# Patient Record
Sex: FEMALE | Race: BLACK OR AFRICAN AMERICAN | HISPANIC OR LATINO | URBAN - METROPOLITAN AREA
[De-identification: names, ages, dates, MRNs, and addresses within clinical notes are randomized per-mention and may not be internally consistent; named-entity substitution may affect disease eponyms.]

---

## 2022-03-18 ENCOUNTER — APPOINTMENT (RX ONLY)
Dept: URBAN - METROPOLITAN AREA CLINIC 147 | Facility: CLINIC | Age: 22
Setting detail: DERMATOLOGY
End: 2022-03-18

## 2022-03-18 DIAGNOSIS — L42 PITYRIASIS ROSEA: ICD-10-CM

## 2022-03-18 PROBLEM — L30.9 DERMATITIS, UNSPECIFIED: Status: ACTIVE | Noted: 2022-03-18

## 2022-03-18 PROCEDURE — ? ADDITIONAL NOTES

## 2022-03-18 PROCEDURE — ? MEDICATION COUNSELING

## 2022-03-18 PROCEDURE — 99204 OFFICE O/P NEW MOD 45 MIN: CPT

## 2022-03-18 PROCEDURE — ? COUNSELING

## 2022-03-18 PROCEDURE — ? PRESCRIPTION

## 2022-03-18 RX ORDER — CLOBETASOL PROPIONATE 0.5 MG/G
CREAM TOPICAL
Qty: 60 | Refills: 0 | Status: ERX | COMMUNITY
Start: 2022-03-18

## 2022-03-18 RX ORDER — METHYLPREDNISOLONE 4 MG/1
TABLET ORAL
Qty: 1 | Refills: 0 | Status: ERX | COMMUNITY
Start: 2022-03-18

## 2022-03-18 RX ADMIN — METHYLPREDNISOLONE: 4 TABLET ORAL at 00:00

## 2022-03-18 RX ADMIN — CLOBETASOL PROPIONATE: 0.5 CREAM TOPICAL at 00:00

## 2022-03-18 ASSESSMENT — LOCATION DETAILED DESCRIPTION DERM
LOCATION DETAILED: RIGHT LATERAL ABDOMEN
LOCATION DETAILED: RIGHT MEDIAL UPPER BACK

## 2022-03-18 ASSESSMENT — LOCATION ZONE DERM: LOCATION ZONE: TRUNK

## 2022-03-18 ASSESSMENT — LOCATION SIMPLE DESCRIPTION DERM
LOCATION SIMPLE: ABDOMEN
LOCATION SIMPLE: RIGHT UPPER BACK

## 2022-03-18 NOTE — PROCEDURE: ADDITIONAL NOTES
Detail Level: Simple
Additional Notes: Patient consent was obtained to proceed with the visit and recommended plan of care after discussion of all risks and benefits, including the risks of COVID-19 exposure.
Detail Level: Zone
Additional Notes: Patient has been sick with a cough, no fever. No history of Covid.\\n\\nLabs ordered - anti ASO titers, mycoplasma pneumo IGG and IGM
Render Risk Assessment In Note?: no

## 2022-03-18 NOTE — PROCEDURE: MEDICATION COUNSELING
Xelaracelyz Pregnancy And Lactation Text: This medication is Pregnancy Category D and is not considered safe during pregnancy.  The risk during breast feeding is also uncertain.

## 2022-04-14 ENCOUNTER — APPOINTMENT (RX ONLY)
Dept: URBAN - METROPOLITAN AREA CLINIC 147 | Facility: CLINIC | Age: 22
Setting detail: DERMATOLOGY
End: 2022-04-14

## 2022-04-14 DIAGNOSIS — L42 PITYRIASIS ROSEA: ICD-10-CM | Status: WORSENING

## 2022-04-14 PROBLEM — L30.9 DERMATITIS, UNSPECIFIED: Status: ACTIVE | Noted: 2022-04-14

## 2022-04-14 PROCEDURE — ? ADDITIONAL NOTES

## 2022-04-14 PROCEDURE — ? COUNSELING

## 2022-04-14 PROCEDURE — ? LAB REPORTS REVIEWED

## 2022-04-14 PROCEDURE — 11104 PUNCH BX SKIN SINGLE LESION: CPT

## 2022-04-14 PROCEDURE — ? PRESCRIPTION MEDICATION MANAGEMENT

## 2022-04-14 PROCEDURE — ? BIOPSY BY PUNCH METHOD

## 2022-04-14 PROCEDURE — ? PRESCRIPTION

## 2022-04-14 RX ORDER — HYDROXYZINE HYDROCHLORIDE 10 MG/1
TABLET, FILM COATED ORAL QHS
Qty: 20 | Refills: 0 | Status: ERX | COMMUNITY
Start: 2022-04-14

## 2022-04-14 RX ADMIN — HYDROXYZINE HYDROCHLORIDE: 10 TABLET, FILM COATED ORAL at 00:00

## 2022-04-14 ASSESSMENT — LOCATION SIMPLE DESCRIPTION DERM
LOCATION SIMPLE: ABDOMEN
LOCATION SIMPLE: RIGHT UPPER BACK

## 2022-04-14 ASSESSMENT — LOCATION DETAILED DESCRIPTION DERM
LOCATION DETAILED: RIGHT LATERAL ABDOMEN
LOCATION DETAILED: LEFT LATERAL ABDOMEN
LOCATION DETAILED: RIGHT MEDIAL UPPER BACK

## 2022-04-14 ASSESSMENT — LOCATION ZONE DERM: LOCATION ZONE: TRUNK

## 2022-04-14 NOTE — PROCEDURE: ADDITIONAL NOTES
Detail Level: Simple
Render Risk Assessment In Note?: no
Additional Notes: Rash has progressed – spreading to more areas of the upper extremities, lower extremities. 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 Discussed biopsy today Dash patient would like to proceed. She still remains with significant pruritus. We will write her for some hydroxyzine – did caution her in regards to drowsiness and would prefer her take it at night time only.

## 2022-04-14 NOTE — PROCEDURE: PRESCRIPTION MEDICATION MANAGEMENT
Initiate Treatment: Zyrtec 10mg qhs\\n\\nHydroxyzine 25mg at bedtime as needed for itch
Continue Regimen: Clobetasol 0.05% cream bid as needed for up to 14 days on/off
Detail Level: Zone
Render In Strict Bullet Format?: No

## 2022-04-28 ENCOUNTER — APPOINTMENT (RX ONLY)
Dept: URBAN - METROPOLITAN AREA CLINIC 147 | Facility: CLINIC | Age: 22
Setting detail: DERMATOLOGY
End: 2022-04-28

## 2022-04-28 DIAGNOSIS — Z48.02 ENCOUNTER FOR REMOVAL OF SUTURES: ICD-10-CM

## 2022-04-28 DIAGNOSIS — L42 PITYRIASIS ROSEA: ICD-10-CM | Status: IMPROVED

## 2022-04-28 PROCEDURE — ? COUNSELING

## 2022-04-28 PROCEDURE — ? SUTURE REMOVAL (GLOBAL PERIOD)

## 2022-04-28 PROCEDURE — 99213 OFFICE O/P EST LOW 20 MIN: CPT

## 2022-04-28 PROCEDURE — ? PRESCRIPTION

## 2022-04-28 PROCEDURE — ? PRESCRIPTION MEDICATION MANAGEMENT

## 2022-04-28 PROCEDURE — ? PATHOLOGY DISCUSSION

## 2022-04-28 PROCEDURE — ? ADDITIONAL NOTES

## 2022-04-28 RX ORDER — TRIAMCINOLONE ACETONIDE 1 MG/G
CREAM TOPICAL BID
Qty: 80 | Refills: 0 | Status: ERX | COMMUNITY
Start: 2022-04-28

## 2022-04-28 RX ADMIN — TRIAMCINOLONE ACETONIDE: 1 CREAM TOPICAL at 00:00

## 2022-04-28 ASSESSMENT — LOCATION DETAILED DESCRIPTION DERM
LOCATION DETAILED: LEFT LATERAL ABDOMEN
LOCATION DETAILED: RIGHT LATERAL ABDOMEN
LOCATION DETAILED: RIGHT MEDIAL UPPER BACK

## 2022-04-28 ASSESSMENT — LOCATION SIMPLE DESCRIPTION DERM
LOCATION SIMPLE: RIGHT UPPER BACK
LOCATION SIMPLE: ABDOMEN

## 2022-04-28 ASSESSMENT — LOCATION ZONE DERM: LOCATION ZONE: TRUNK

## 2022-04-28 NOTE — PROCEDURE: ADDITIONAL NOTES
Detail Level: Simple
Additional Notes: Patient understands that IL can last months in some cases. Some new areas on abdomen, but many on extremities are resolving.
Render Risk Assessment In Note?: no

## 2022-04-28 NOTE — PROCEDURE: SUTURE REMOVAL (GLOBAL PERIOD)
Detail Level: Simple
Add 83940 Cpt? (Important Note: In 2017 The Use Of 13770 Is Being Tracked By Cms To Determine Future Global Period Reimbursement For Global Periods): no

## 2022-04-28 NOTE — PROCEDURE: PRESCRIPTION MEDICATION MANAGEMENT
Continue Regimen: Triamcinalone 0.1% cream bid for up to 14 days. Do not use with other steroids. \\n\\nZyrtec 10mg qhs
Detail Level: Zone
Render In Strict Bullet Format?: No
Discontinue Regimen: Hydroxyzine \\nBetamethasone

## 2022-06-09 ENCOUNTER — APPOINTMENT (RX ONLY)
Dept: URBAN - METROPOLITAN AREA CLINIC 147 | Facility: CLINIC | Age: 22
Setting detail: DERMATOLOGY
End: 2022-06-09

## 2022-06-09 DIAGNOSIS — L42 PITYRIASIS ROSEA: ICD-10-CM | Status: RESOLVING

## 2022-06-09 PROCEDURE — ? ADDITIONAL NOTES

## 2022-06-09 PROCEDURE — ? COUNSELING

## 2022-06-09 PROCEDURE — 99213 OFFICE O/P EST LOW 20 MIN: CPT

## 2022-06-09 PROCEDURE — ? PRESCRIPTION MEDICATION MANAGEMENT

## 2022-06-09 ASSESSMENT — LOCATION SIMPLE DESCRIPTION DERM
LOCATION SIMPLE: ABDOMEN
LOCATION SIMPLE: RIGHT UPPER BACK

## 2022-06-09 ASSESSMENT — LOCATION ZONE DERM: LOCATION ZONE: TRUNK

## 2022-06-09 ASSESSMENT — LOCATION DETAILED DESCRIPTION DERM
LOCATION DETAILED: RIGHT LATERAL ABDOMEN
LOCATION DETAILED: RIGHT MEDIAL UPPER BACK

## 2022-06-09 NOTE — PROCEDURE: ADDITIONAL NOTES
Additional Notes: Patient has had a protracted course of DE, but is continuing to resolve. Very few remaining areas - primarily PIH. \\n\\nUsing some natural [place] soaps currently. Not using any prescribed Rx - has discontinued. Itch minimal. \\n\\nPatient will return if new areas or fails to resolve. Additional Notes: Patient has had a protracted course of CA, but is continuing to resolve. Very few remaining areas - primarily PIH. \\n\\nUsing some natural [place] soaps currently. Not using any prescribed Rx - has discontinued. Itch minimal. \\n\\nPatient will return if new areas or fails to resolve.

## 2022-06-09 NOTE — PROCEDURE: PRESCRIPTION MEDICATION MANAGEMENT
Continue Regimen: Gentle emollients and scent free lotions \\nZyrtec 10mg qhs
Detail Level: Zone
Render In Strict Bullet Format?: No
Discontinue Regimen: Hydroxyzine \\nBetamethasone

## 2022-11-30 NOTE — PROCEDURE: MEDICATION COUNSELING
Benzoyl Peroxide Pregnancy And Lactation Text: This medication is Pregnancy Category C. It is unknown if benzoyl peroxide is excreted in breast milk. Mastoid Interpolation Flap Text: A decision was made to reconstruct the defect utilizing an interpolation axial flap and a staged reconstruction.  A telfa template was made of the defect.  This telfa template was then used to outline the mastoid interpolation flap.  The donor area for the pedicle flap was then injected with anesthesia.  The flap was excised through the skin and subcutaneous tissue down to the layer of the underlying musculature.  The pedicle flap was carefully excised within this deep plane to maintain its blood supply.  The edges of the donor site were undermined.   The donor site was closed in a primary fashion.  The pedicle was then rotated into position and sutured.  Once the tube was sutured into place, adequate blood supply was confirmed with blanching and refill.  The pedicle was then wrapped with xeroform gauze and dressed appropriately with a telfa and gauze bandage to ensure continued blood supply and protect the attached pedicle.

## 2023-01-11 ENCOUNTER — APPOINTMENT (RX ONLY)
Dept: URBAN - METROPOLITAN AREA CLINIC 147 | Facility: CLINIC | Age: 23
Setting detail: DERMATOLOGY
End: 2023-01-11

## 2023-01-11 DIAGNOSIS — L30.4 ERYTHEMA INTERTRIGO: ICD-10-CM

## 2023-01-11 DIAGNOSIS — L08.0 PYODERMA: ICD-10-CM

## 2023-01-11 PROCEDURE — ? ADDITIONAL NOTES

## 2023-01-11 PROCEDURE — ? MEDICATION COUNSELING

## 2023-01-11 PROCEDURE — ? COUNSELING

## 2023-01-11 PROCEDURE — ? PRESCRIPTION

## 2023-01-11 PROCEDURE — ? PRESCRIPTION MEDICATION MANAGEMENT

## 2023-01-11 PROCEDURE — ? ORDER TESTS

## 2023-01-11 PROCEDURE — 99213 OFFICE O/P EST LOW 20 MIN: CPT

## 2023-01-11 RX ORDER — GENTAMICIN SULFATE 1 MG/G
OINTMENT TOPICAL
Qty: 30 | Refills: 0 | Status: ERX | COMMUNITY
Start: 2023-01-11

## 2023-01-11 RX ORDER — KETOCONAZOLE 20 MG/G
CREAM TOPICAL
Qty: 30 | Refills: 0 | Status: ERX | COMMUNITY
Start: 2023-01-11

## 2023-01-11 RX ORDER — HYDROCORTISONE 25 MG/G
CREAM TOPICAL BID
Qty: 30 | Refills: 0 | Status: ERX | COMMUNITY
Start: 2023-01-11

## 2023-01-11 RX ADMIN — GENTAMICIN SULFATE: 1 OINTMENT TOPICAL at 00:00

## 2023-01-11 RX ADMIN — HYDROCORTISONE: 25 CREAM TOPICAL at 00:00

## 2023-01-11 RX ADMIN — KETOCONAZOLE: 20 CREAM TOPICAL at 00:00

## 2023-01-11 ASSESSMENT — LOCATION SIMPLE DESCRIPTION DERM
LOCATION SIMPLE: PERINEAL BODY
LOCATION SIMPLE: LABIA MAJORA

## 2023-01-11 ASSESSMENT — LOCATION DETAILED DESCRIPTION DERM
LOCATION DETAILED: PERINEAL BODY
LOCATION DETAILED: RIGHT LABIUM MAJUS

## 2023-01-11 ASSESSMENT — LOCATION ZONE DERM
LOCATION ZONE: VULVA
LOCATION ZONE: ANUS

## 2023-01-11 NOTE — HPI: RASH
Initial
What Type Of Note Output Would You Prefer (Optional)?: Standard Output
How Severe Is Your Rash?: mild
Is This A New Presentation, Or A Follow-Up?: Rash

## 2023-01-11 NOTE — PROCEDURE: MEDICATION COUNSELING
--OTC nasal steroid (Flonase, Nasocort) 2 sprays/nostril at bedtime x 3-5 days  --OTC decongestant (Sudafed)  --Seek re-evaluation if no resolution/worsening despite these measures over the next 3-5 days  Infliximab Counseling:  I discussed with the patient the risks of infliximab including but not limited to myelosuppression, immunosuppression, autoimmune hepatitis, demyelinating diseases, lymphoma, and serious infections.  The patient understands that monitoring is required including a PPD at baseline and must alert us or the primary physician if symptoms of infection or other concerning signs are noted.

## 2023-01-11 NOTE — PROCEDURE: ADDITIONAL NOTES
Render Risk Assessment In Note?: no
Detail Level: Simple
Additional Notes: Pt denies change in soap or any feminine product

## 2023-01-11 NOTE — PROCEDURE: ORDER TESTS
Expected Date Of Service: 01/11/2023
Performing Laboratory: -[id number]
Bill For Surgical Tray: no
Billing Type: Third-Party Bill

## 2023-01-11 NOTE — PROCEDURE: PRESCRIPTION MEDICATION MANAGEMENT
Detail Level: Zone
Render In Strict Bullet Format?: No
Initiate Treatment: Gentamicin- apply to affected area tid x2 weeks
Initiate Treatment: Ketoconazole- apply to affected are bid \\nHydrocortisone- apply to affected are once bid x10 days